# Patient Record
Sex: FEMALE | Race: WHITE | NOT HISPANIC OR LATINO | URBAN - METROPOLITAN AREA
[De-identification: names, ages, dates, MRNs, and addresses within clinical notes are randomized per-mention and may not be internally consistent; named-entity substitution may affect disease eponyms.]

---

## 2019-05-23 ENCOUNTER — APPOINTMENT (OUTPATIENT)
Dept: URBAN - METROPOLITAN AREA SURGERY 9 | Age: 84
Setting detail: DERMATOLOGY
End: 2019-05-23

## 2019-05-23 DIAGNOSIS — Z85.828 PERSONAL HISTORY OF OTHER MALIGNANT NEOPLASM OF SKIN: ICD-10-CM

## 2019-05-23 DIAGNOSIS — L57.0 ACTINIC KERATOSIS: ICD-10-CM

## 2019-05-23 PROBLEM — I48.91 UNSPECIFIED ATRIAL FIBRILLATION: Status: ACTIVE | Noted: 2019-05-23

## 2019-05-23 PROBLEM — D48.5 NEOPLASM OF UNCERTAIN BEHAVIOR OF SKIN: Status: ACTIVE | Noted: 2019-05-23

## 2019-05-23 PROCEDURE — 17003 DESTRUCT PREMALG LES 2-14: CPT

## 2019-05-23 PROCEDURE — OTHER PATHOLOGY BILLING: OTHER

## 2019-05-23 PROCEDURE — OTHER BIOPSY BY SHAVE METHOD: OTHER

## 2019-05-23 PROCEDURE — OTHER LIQUID NITROGEN: OTHER

## 2019-05-23 PROCEDURE — 99202 OFFICE O/P NEW SF 15 MIN: CPT | Mod: 25

## 2019-05-23 PROCEDURE — 11102 TANGNTL BX SKIN SINGLE LES: CPT

## 2019-05-23 PROCEDURE — 17000 DESTRUCT PREMALG LESION: CPT | Mod: 59

## 2019-05-23 PROCEDURE — 11103 TANGNTL BX SKIN EA SEP/ADDL: CPT

## 2019-05-23 PROCEDURE — OTHER REASSURANCE: OTHER

## 2019-05-23 PROCEDURE — OTHER MIPS QUALITY: OTHER

## 2019-05-23 PROCEDURE — 88305 TISSUE EXAM BY PATHOLOGIST: CPT | Mod: TC

## 2019-05-23 ASSESSMENT — LOCATION DETAILED DESCRIPTION DERM
LOCATION DETAILED: RIGHT NASAL ROOT
LOCATION DETAILED: RIGHT SUPERIOR HELIX
LOCATION DETAILED: POSTERIOR MID-PARIETAL SCALP
LOCATION DETAILED: LEFT DORSAL WRIST
LOCATION DETAILED: LEFT NASAL SIDEWALL

## 2019-05-23 ASSESSMENT — LOCATION ZONE DERM
LOCATION ZONE: ARM
LOCATION ZONE: SCALP
LOCATION ZONE: EAR
LOCATION ZONE: NOSE

## 2019-05-23 ASSESSMENT — LOCATION SIMPLE DESCRIPTION DERM
LOCATION SIMPLE: RIGHT EAR
LOCATION SIMPLE: POSTERIOR SCALP
LOCATION SIMPLE: LEFT WRIST
LOCATION SIMPLE: LEFT NOSE
LOCATION SIMPLE: NOSE

## 2019-05-23 NOTE — PROCEDURE: LIQUID NITROGEN
Duration Of Freeze Thaw-Cycle (Seconds): 5
Render In Bullet Format When Appropriate: No
Detail Level: Simple
Total Number Of Aks Treated: 3
Consent: The patient's consent was obtained including but not limited to risks of crusting, blistering, scarring, pigmentary change.
Number Of Freeze-Thaw Cycles: 1 freeze-thaw cycle
Post-Care Instructions: Pt may apply Vaseline to crusted or scabbing areas.

## 2019-05-23 NOTE — PROCEDURE: PATHOLOGY BILLING
Immunohistochemistry (10914 and [id number]) billing is not performed here. Please use the Immunohistochemistry Stain Billing plan to accomplish this. Immunohistochemistry (45377 and 60076) billing is not performed here. Please use the Immunohistochemistry Stain Billing plan to accomplish this.

## 2019-06-18 ENCOUNTER — APPOINTMENT (OUTPATIENT)
Dept: URBAN - METROPOLITAN AREA SURGERY 9 | Age: 84
Setting detail: DERMATOLOGY
End: 2019-06-19

## 2019-06-18 VITALS — DIASTOLIC BLOOD PRESSURE: 80 MMHG | SYSTOLIC BLOOD PRESSURE: 120 MMHG | HEART RATE: 60 BPM | RESPIRATION RATE: 16 BRPM

## 2019-06-18 PROBLEM — C44.329 SQUAMOUS CELL CARCINOMA OF SKIN OF OTHER PARTS OF FACE: Status: ACTIVE | Noted: 2019-06-18

## 2019-06-18 PROBLEM — C44.319 BASAL CELL CARCINOMA OF SKIN OF OTHER PARTS OF FACE: Status: ACTIVE | Noted: 2019-06-18

## 2019-06-18 PROBLEM — J44.9 CHRONIC OBSTRUCTIVE PULMONARY DISEASE, UNSPECIFIED: Status: ACTIVE | Noted: 2019-06-18

## 2019-06-18 PROCEDURE — 14301 TIS TRNFR ANY 30.1-60 SQ CM: CPT

## 2019-06-18 PROCEDURE — OTHER MOHS SURGERY: OTHER

## 2019-06-18 PROCEDURE — 13132 CMPLX RPR F/C/C/M/N/AX/G/H/F: CPT | Mod: 59

## 2019-06-18 PROCEDURE — OTHER PRESCRIPTION: OTHER

## 2019-06-18 PROCEDURE — 17311 MOHS 1 STAGE H/N/HF/G: CPT

## 2019-06-18 PROCEDURE — 99212 OFFICE O/P EST SF 10 MIN: CPT | Mod: 25

## 2019-06-18 PROCEDURE — OTHER CONSULTATION FOR MOHS SURGERY: OTHER

## 2019-06-18 PROCEDURE — 17312 MOHS ADDL STAGE: CPT

## 2019-06-18 PROCEDURE — 17311 MOHS 1 STAGE H/N/HF/G: CPT | Mod: 76

## 2019-06-18 RX ORDER — HYDROCODONE BITARTRATE AND ACETAMINOPHEN 5; 325 MG/1; MG/1
TABLET ORAL
Qty: 8 | Refills: 0 | COMMUNITY
Start: 2019-06-18

## 2019-06-18 NOTE — PROCEDURE: CONSULTATION FOR MOHS SURGERY
Detail Level: Detailed
X Size Of Lesion In Cm (Optional): 0
Body Location Override (Optional - Billing Will Still Be Based On Selected Body Map Location If Applicable): left inferior central forehead
Incorporate Mauc In Note: Yes
Body Location Override (Optional - Billing Will Still Be Based On Selected Body Map Location If Applicable): left superior medial buccal cheek

## 2019-06-21 ENCOUNTER — APPOINTMENT (OUTPATIENT)
Dept: URBAN - METROPOLITAN AREA SURGERY 9 | Age: 84
Setting detail: DERMATOLOGY
End: 2019-06-24

## 2019-06-21 DIAGNOSIS — L76.21 POSTPROCEDURAL HEMORRHAGE OF SKIN AND SUBCUTANEOUS TISSUE FOLLOWING A DERMATOLOGIC PROCEDURE: ICD-10-CM

## 2019-06-21 PROCEDURE — 99024 POSTOP FOLLOW-UP VISIT: CPT

## 2019-06-21 PROCEDURE — OTHER POST-OP WOUND CHECK: OTHER

## 2019-06-21 PROCEDURE — OTHER PRESCRIPTION: OTHER

## 2019-06-21 RX ORDER — DOXYCYCLINE HYCLATE 100 MG/1
TABLET, COATED ORAL BID
Qty: 10 | Refills: 0 | COMMUNITY
Start: 2019-06-21

## 2019-06-21 RX ORDER — HYDROCODONE BITARTRATE AND ACETAMINOPHEN 5; 325 MG/1; MG/1
TABLET ORAL
Qty: 4 | Refills: 0 | COMMUNITY
Start: 2019-06-21

## 2019-06-21 ASSESSMENT — LOCATION DETAILED DESCRIPTION DERM: LOCATION DETAILED: LEFT INFERIOR FOREHEAD

## 2019-06-21 ASSESSMENT — LOCATION SIMPLE DESCRIPTION DERM: LOCATION SIMPLE: LEFT FOREHEAD

## 2019-06-21 ASSESSMENT — LOCATION ZONE DERM: LOCATION ZONE: FACE

## 2019-06-21 NOTE — PROCEDURE: POST-OP WOUND CHECK
Additional Comments: Antibiotics given due to increased risk of infection in nursing home, manipulation of dressing and need to keep dressing in place for 3 more days due to restrictions in wound care at nursing home
Detail Level: Zone
Wound Evaluated By: Dr. Amor
Add 63249 Cpt? (Important Note: In 2017 The Use Of 43718 Is Being Tracked By Cms To Determine Future Global Period Reimbursement For Global Periods): yes

## 2019-07-02 ENCOUNTER — APPOINTMENT (OUTPATIENT)
Dept: URBAN - METROPOLITAN AREA SURGERY 9 | Age: 84
Setting detail: DERMATOLOGY
End: 2019-07-08

## 2019-07-02 VITALS — RESPIRATION RATE: 16 BRPM | DIASTOLIC BLOOD PRESSURE: 70 MMHG | HEART RATE: 60 BPM | SYSTOLIC BLOOD PRESSURE: 124 MMHG

## 2019-07-02 PROBLEM — C44.329 SQUAMOUS CELL CARCINOMA OF SKIN OF OTHER PARTS OF FACE: Status: ACTIVE | Noted: 2019-07-02

## 2019-07-02 PROCEDURE — 14041 TIS TRNFR F/C/C/M/N/A/G/H/F: CPT | Mod: 79

## 2019-07-02 PROCEDURE — 17312 MOHS ADDL STAGE: CPT | Mod: 79

## 2019-07-02 PROCEDURE — OTHER MOHS SURGERY: OTHER

## 2019-07-02 PROCEDURE — 17311 MOHS 1 STAGE H/N/HF/G: CPT | Mod: 79

## 2019-07-02 PROCEDURE — OTHER RETURN TO REFERRING PROVIDER: OTHER

## 2019-08-27 ENCOUNTER — APPOINTMENT (OUTPATIENT)
Dept: URBAN - METROPOLITAN AREA SURGERY 9 | Age: 84
Setting detail: DERMATOLOGY
End: 2019-08-29

## 2019-08-27 DIAGNOSIS — Z48.817 ENCOUNTER FOR SURGICAL AFTERCARE FOLLOWING SURGERY ON THE SKIN AND SUBCUTANEOUS TISSUE: ICD-10-CM

## 2019-08-27 PROCEDURE — OTHER POST-OP WOUND CHECK: OTHER

## 2019-08-27 PROCEDURE — 99024 POSTOP FOLLOW-UP VISIT: CPT

## 2019-08-27 ASSESSMENT — LOCATION DETAILED DESCRIPTION DERM: LOCATION DETAILED: RIGHT INFERIOR CENTRAL MALAR CHEEK

## 2019-08-27 ASSESSMENT — LOCATION ZONE DERM: LOCATION ZONE: FACE

## 2019-08-27 ASSESSMENT — LOCATION SIMPLE DESCRIPTION DERM: LOCATION SIMPLE: RIGHT CHEEK

## 2019-08-27 NOTE — PROCEDURE: POST-OP WOUND CHECK
Detail Level: Detailed
Add 94100 Cpt? (Important Note: In 2017 The Use Of 83191 Is Being Tracked By Cms To Determine Future Global Period Reimbursement For Global Periods): yes
Body Location Override (Optional - Billing Will Still Be Based On Selected Body Map Location If Applicable): right medial malar cheek
Wound Evaluated By: Dr. Blackburn

## 2019-11-22 ENCOUNTER — APPOINTMENT (OUTPATIENT)
Dept: URBAN - METROPOLITAN AREA SURGERY 9 | Age: 84
Setting detail: DERMATOLOGY
End: 2019-11-22

## 2019-11-22 DIAGNOSIS — Z85.828 PERSONAL HISTORY OF OTHER MALIGNANT NEOPLASM OF SKIN: ICD-10-CM

## 2019-11-22 DIAGNOSIS — L82.1 OTHER SEBORRHEIC KERATOSIS: ICD-10-CM

## 2019-11-22 DIAGNOSIS — L57.0 ACTINIC KERATOSIS: ICD-10-CM

## 2019-11-22 PROBLEM — D48.5 NEOPLASM OF UNCERTAIN BEHAVIOR OF SKIN: Status: ACTIVE | Noted: 2019-11-22

## 2019-11-22 PROCEDURE — OTHER LIQUID NITROGEN: OTHER

## 2019-11-22 PROCEDURE — OTHER BIOPSY BY SHAVE METHOD AND DESTRUCTION: OTHER

## 2019-11-22 PROCEDURE — 11103 TANGNTL BX SKIN EA SEP/ADDL: CPT

## 2019-11-22 PROCEDURE — OTHER OTHER: OTHER

## 2019-11-22 PROCEDURE — 11102 TANGNTL BX SKIN SINGLE LES: CPT

## 2019-11-22 PROCEDURE — 17003 DESTRUCT PREMALG LES 2-14: CPT

## 2019-11-22 PROCEDURE — 17000 DESTRUCT PREMALG LESION: CPT | Mod: 59

## 2019-11-22 PROCEDURE — OTHER BIOPSY BY SHAVE METHOD: OTHER

## 2019-11-22 PROCEDURE — OTHER REASSURANCE: OTHER

## 2019-11-22 PROCEDURE — 99213 OFFICE O/P EST LOW 20 MIN: CPT | Mod: 25

## 2019-11-22 ASSESSMENT — LOCATION SIMPLE DESCRIPTION DERM
LOCATION SIMPLE: GLABELLA
LOCATION SIMPLE: LEFT WRIST
LOCATION SIMPLE: LEFT CHEEK
LOCATION SIMPLE: POSTERIOR SCALP
LOCATION SIMPLE: UPPER BACK
LOCATION SIMPLE: RIGHT EAR
LOCATION SIMPLE: LEFT FOREHEAD

## 2019-11-22 ASSESSMENT — LOCATION DETAILED DESCRIPTION DERM
LOCATION DETAILED: SUPERIOR THORACIC SPINE
LOCATION DETAILED: POSTERIOR MID-PARIETAL SCALP
LOCATION DETAILED: LEFT INFERIOR FOREHEAD
LOCATION DETAILED: GLABELLA
LOCATION DETAILED: LEFT MEDIAL MALAR CHEEK
LOCATION DETAILED: LEFT DORSAL WRIST
LOCATION DETAILED: RIGHT SUPERIOR HELIX

## 2019-11-22 ASSESSMENT — LOCATION ZONE DERM
LOCATION ZONE: FACE
LOCATION ZONE: TRUNK
LOCATION ZONE: ARM
LOCATION ZONE: EAR
LOCATION ZONE: SCALP

## 2019-11-22 NOTE — PROCEDURE: LIQUID NITROGEN
Post-Care Instructions: Pt may apply Vaseline to crusted or scabbing areas.
Render In Bullet Format When Appropriate: No
Total Number Of Aks Treated: 2
Duration Of Freeze Thaw-Cycle (Seconds): 5
Detail Level: Simple
Number Of Freeze-Thaw Cycles: 1 freeze-thaw cycle
Consent: The patient's consent was obtained including but not limited to risks of crusting, blistering, scarring, pigmentary change.

## 2019-11-22 NOTE — PROCEDURE: OTHER
Detail Level: Simple
Note Text (......Xxx Chief Complaint.): This diagnosis correlates with the
Other (Free Text): C&D was attempted but it quickly went to fat. Consider excision unless needing to go to [facility] for the sidewall biopsy site

## 2020-01-08 ENCOUNTER — APPOINTMENT (OUTPATIENT)
Dept: URBAN - METROPOLITAN AREA SURGERY 9 | Age: 85
Setting detail: DERMATOLOGY
End: 2020-01-09

## 2020-01-08 PROBLEM — C44.319 BASAL CELL CARCINOMA OF SKIN OF OTHER PARTS OF FACE: Status: ACTIVE | Noted: 2020-01-08

## 2020-01-08 PROCEDURE — 12052 INTMD RPR FACE/MM 2.6-5.0 CM: CPT

## 2020-01-08 PROCEDURE — 11642 EXC F/E/E/N/L MAL+MRG 1.1-2: CPT

## 2020-01-08 PROCEDURE — OTHER EXCISION: OTHER

## 2020-01-08 NOTE — PROCEDURE: EXCISION
Path Notes (To The Dermatopathologist): Please check margins. Superior score\\nAccession #[id number] Path Notes (To The Dermatopathologist): Please check margins. Superior score\\nAccession #[id number]

## 2020-07-16 NOTE — PROCEDURE: BIOPSY BY SHAVE METHOD
and her legs gave out. Left hip is tender now and she scraped her knee up some. Feels like hip is tender on lateral side from bruise from fall. Legs are weak, is seeing PT/OT and that's going well.      Is able to take thyroid medication. Sees endocrine. Currently taking 250 of Levothyroxine     Not having alcoholic beverages every day. 6-7 days out of the month she drinks 6-8 drinks on those days. Discussed this may be a cause of sinus tachycardia and third spacing/nutrition issues. Patient Active Problem List   Diagnosis    TIA (transient ischemic attack)    Hypothyroidism    DVT (deep venous thrombosis) (HCC)    Depression    Anxiety    Fibromyalgia    Fatty liver    Tachycardia    GERD (gastroesophageal reflux disease)    Recurrent cold sores    Irritable bowel    Gastric bypass status for obesity    Insomnia    Ovarian cyst    Muscle weakness (generalized)    Alcoholism (HCC)    Osteoarthritis    Hypocalcemia    Hypomagnesemia    Moderate malnutrition (HCC)    Other hypoparathyroidism (HCC)    Pancytopenia (HCC)    Palpitations    Asymptomatic bacteriuria    Hyperphosphatemia    Prolonged Q-T interval on ECG    Hypoalbuminemia    Tinnitus of both ears    Hx of parathyroidectomy    Normocytic anemia    History of fibromyalgia    Physical deconditioning    Severe malnutrition (HCC)    Bacteremia    Fever    Calcification of breast    Hypotension    Steatosis of liver    Nutritional marasmus (HCC)    Discitis of lumbar region    Discitis    Hypokalemia    Mass of joint of left hip    Weight loss    Lumbar discitis       The patient is allergic to penicillins; sulfa antibiotics; and bactrim [sulfamethoxazole-trimethoprim].     Past Medical History  Leilani Smallwood has a past medical history of Alcoholism ([address] Anxiety, Atrophy of vulva, Atyp squam cell of undet signfc cyto smr crvx (ASC-US), Closed fracture of seventh cervical vertebra without spinal cord injury ([address] Closed fracture of sixth cervical vertebra (HCC), Closed fracture of thoracic vertebra ([address] Depression, Dry eye syndrome, DVT (deep venous thrombosis) ([address] Dyspareunia in female, External hemorrhoids without complication, Fibromyalgia, Gastric bypass status for obesity, GERD (gastroesophageal reflux disease), Hiatal hernia, History of ITP, Hungry bone syndrome, Hx of blood clots, Hyperparathyroidism ([address] Hypocalcemia, Hypothyroidism, Insomnia, Irritable bowel, Lactose intolerance, Menopausal syndrome, Osteoarthritis, Osteopenia, Osteopenia, Ovarian cyst, Oxaluria ([address] Recurrent cold sores, Sepsis ([address] Tachycardia, TIA (transient ischemic attack), and Tubular adenoma of colon. Past SurgicalHistory  The patient  has a past surgical history that includes Wrist fracture surgery (Right); Leg Surgery (Right); Tonsillectomy and adenoidectomy; Gastric bypass surgery (1996); Cholecystectomy (1996); Appendectomy (1996); parathyroidectomy (08/2017); Colonoscopy (approx 2013); Upper gastrointestinal endoscopy (approx 2013); laparoscopy; Foot surgery; Tubal ligation (1996); Endoscopy, colon, diagnostic; fracture surgery; Dilatation, esophagus; Colonoscopy (Left, 5/7/2019); and Upper gastrointestinal endoscopy (Left, 5/7/2019). Family History  This patient's family history includes Asthma in her maternal grandfather; Breast Cancer in her mother and sister; Cancer in her father; Depression in her father and mother; Heart Attack in her maternal grandmother; High Blood Pressure in her father and mother; Mental Illness in her maternal grandmother; Other in her father, maternal grandfather, maternal grandmother, and mother. Social History  Prince Biswas  reports that she has never smoked. She has never used smokeless tobacco. She reports current alcohol use of about 9.0 standard drinks of alcohol per week. She reports that she does not use drugs.     Medications    Current Outpatient Medications:     potassium chloride (MICRO-K) 10 MEQ extended release capsule, Take 10 mEq by mouth daily , Disp: , Rfl:     Psyllium (METAMUCIL FIBER PO), Take by mouth 5 capsules once daily, Disp: , Rfl:     aMILoride (MIDAMOR) 5 MG tablet, TAKING 2.5MG THREE TIMES WEEKLY, Disp: 30 tablet, Rfl: 3    bumetanide (BUMEX) 1 MG tablet, TAKE ONE HALF TAB  THREE TIMES WEEKLY, Disp: 90 tablet, Rfl: 1    DULoxetine (CYMBALTA) 30 MG extended release capsule, TAKE 1 CAPSULE DAILY, Disp: 90 capsule, Rfl: 0    DULoxetine (CYMBALTA) 60 MG extended release capsule, Take 1 capsule by mouth nightly, Disp: 90 capsule, Rfl: 1    gabapentin (NEURONTIN) 100 MG capsule, Take 1 capsule by mouth daily for 90 days. , Disp: 180 capsule, Rfl: 1    gabapentin (NEURONTIN) 300 MG capsule, Take 1 capsule by mouth nightly for 180 days. , Disp: 90 capsule, Rfl: 1    metoprolol tartrate (LOPRESSOR) 25 MG tablet, Take 0.5 tablets by mouth 2 times daily, Disp: 90 tablet, Rfl: 1    tiZANidine (ZANAFLEX) 4 MG tablet, Take 1 tablet by mouth nightly At bed time, Disp: 90 tablet, Rfl: 1    vitamin B-12 (CYANOCOBALAMIN) 500 MCG tablet, TAKE 1 TABLET DAILY, Disp: 30 tablet, Rfl: 11    acyclovir (ZOVIRAX) 400 MG tablet, TAKE 1 TABLET TWICE A DAY, Disp: 180 tablet, Rfl: 3    Estradiol (VAGIFEM) 10 MCG TABS vaginal tablet, AT THE START OF THERAPY INSERT 1 TABLET VAGINALLY DAILY FOR 2 WEEKS , THEN TWICE WEEKLY THEREAFTER, Disp: 40 tablet, Rfl: 3    CALCIUM REPLACEMENT PROTOCOL, 500 mg by Other route daily , Disp: , Rfl:     Parathyroid Hormone, Recomb, 50 MCG CART, Inject 0.1 mcg into the skin Natpara Injection, Disp: , Rfl:     calcitRIOL (ROCALTROL) 0.5 MCG capsule, Two tablets twice daily (Patient taking differently: Take 0.5 mcg by mouth daily Two tablets twice daily), Disp: 30 capsule, Rfl: 3    magnesium cl-calcium carbonate (SLOW-MAG) 71.5-119 MG TBEC tablet, Take 1 tablet by mouth 4 times daily (Patient taking differently: Take 1 tablet by mouth 2 times daily ), Disp: 3 tablet, Rfl: Render In Bullet Format When Appropriate: No 0    loperamide (IMODIUM) 2 MG capsule, Take 2 capsules by mouth 4 times daily as needed for Diarrhea, Disp: , Rfl:     SYNTHROID 50 MCG tablet, Take 1 tablet by mouth Daily Additional RF per her endocrinoloist, Disp: 30 tablet, Rfl: 0    vitamin B-6 (PYRIDOXINE) 100 MG tablet, Take 100 mg by mouth daily, Disp: , Rfl:     zinc gluconate 50 MG tablet, TAKE ONE AND ONE-HALF TABLETS (75 MG) DAILY, Disp: 135 tablet, Rfl: 0    vitamin B-1 (THIAMINE) 100 MG tablet, Take 100 mg by mouth daily, Disp: , Rfl:     ondansetron (ZOFRAN ODT) 4 MG disintegrating tablet, Take 1 tablet by mouth every 8 hours as needed for Nausea, Disp: 10 tablet, Rfl: 0    Vitamin K, Phytonadione, 100 MCG TABS, Take 3 tablets by mouth daily, Disp: 270 tablet, Rfl: 0    FOLIC ACID PO, Take 1,611 mg by mouth daily, Disp: , Rfl:     SYNTHROID 200 MCG tablet, Take 200 mcg by mouth daily, Disp: , Rfl:     esomeprazole (NEXIUM) 20 MG delayed release capsule, Take 20 mg by mouth 2 times daily, Disp: , Rfl:     vitamin A 33810 units capsule, Take 25,000 Units by mouth daily, Disp: , Rfl:     neomycin-bacitracin-polymyxin (NEOSPORIN) 400-5-5000 ointment, Apply topically, Disp: , Rfl:     Subjective:      Review of Systems   Constitutional: Negative for chills and fever. HENT: Positive for rhinorrhea, sneezing and sore throat. Negative for congestion, postnasal drip, sinus pressure, sinus pain and trouble swallowing. Seasonal allergies likely    Eyes: Positive for pain. Has chronic dry eye   Respiratory: Positive for cough and shortness of breath. Negative for wheezing. Mild slight dry cough  SOB on exertion, attributes to calcium   Cardiovascular: Positive for leg swelling. Negative for chest pain. Gastrointestinal: Positive for diarrhea. Negative for abdominal pain, blood in stool, constipation, nausea and vomiting.         Due to gastric bypass   Genitourinary: Negative for dysuria, frequency, urgency and vaginal DULoxetine (CYMBALTA) 60 MG extended release capsule; Take 1 capsule by mouth nightly  Dispense: 90 capsule; Refill: 1  - gabapentin (NEURONTIN) 100 MG capsule; Take 1 capsule by mouth daily for 90 days. Dispense: 180 capsule; Refill: 1  - gabapentin (NEURONTIN) 300 MG capsule; Take 1 capsule by mouth nightly for 180 days. Dispense: 90 capsule; Refill: 1  - tiZANidine (ZANAFLEX) 4 MG tablet; Take 1 tablet by mouth nightly At bed time  Dispense: 90 tablet; Refill: 1    11. Palpitations  Has sinus tach when off the metoprolol  - metoprolol tartrate (LOPRESSOR) 25 MG tablet; Take 0.5 tablets by mouth 2 times daily  Dispense: 90 tablet; Refill: 1         Return in about 3 months (around 10/16/2020) for Follow-up chronic conditions. Orders Placed   Orders Placed This Encounter   Procedures    Comprehensive Metabolic Panel     Standing Status:   Future     Standing Expiration Date:   7/16/2021       Prescriptions given/sent  Orders Placed This Encounter   Medications    aMILoride (MIDAMOR) 5 MG tablet     Sig: TAKING 2.5MG THREE TIMES WEEKLY     Dispense:  30 tablet     Refill:  3    bumetanide (BUMEX) 1 MG tablet     Sig: TAKE ONE HALF TAB  THREE TIMES WEEKLY     Dispense:  90 tablet     Refill:  1    DULoxetine (CYMBALTA) 30 MG extended release capsule     Sig: TAKE 1 CAPSULE DAILY     Dispense:  90 capsule     Refill:  0    DULoxetine (CYMBALTA) 60 MG extended release capsule     Sig: Take 1 capsule by mouth nightly     Dispense:  90 capsule     Refill:  1    gabapentin (NEURONTIN) 100 MG capsule     Sig: Take 1 capsule by mouth daily for 90 days. Dispense:  180 capsule     Refill:  1    gabapentin (NEURONTIN) 300 MG capsule     Sig: Take 1 capsule by mouth nightly for 180 days.      Dispense:  90 capsule     Refill:  1    metoprolol tartrate (LOPRESSOR) 25 MG tablet     Sig: Take 0.5 tablets by mouth 2 times daily     Dispense:  90 tablet     Refill:  1    tiZANidine (ZANAFLEX) 4 MG tablet     Sig: Take 1 tablet by mouth nightly At bed time     Dispense:  90 tablet     Refill:  1       Patient given educational materials - see patient instructions. Discussed use, benefit, and side effects of prescribed medications. All patientquestions answered. Pt voiced understanding. Reviewed health maintenance. Attending attestation:  I performed a history and physical examination of the patient and discussed management with the resident. I reviewed the resident's note and agree with the documented findings and plan of care. Cystic structure in left adnexa is stable but anasarca. I suspect alcoholism with poor nutrition in combination with gastric bypass and electrolyte abnormalities related to hyperparathyroidism all contribute to her presentation. Discussed that blood pressure being low may contribute to hypotension and falls, patient is not amenable to wean of any agents today. I did encourage follow-up and discussion with her specialist. Kavya Enter in detail regarding alcoholism. I think when she has stopped drinking in the past, absence of prompt improvement in functional status has been a disappointment. Encouraged re-trial of cessation. It may take time (a year or more) to see improvement given chronic malnutrition. Patient is contemplative. At baseline overall. May trial allergy medications. Indications for prompt return if worsening reviewed in detail.               Electronically signed by Sondra Nava MD on 7/16/2020 at 5:03 PM

## 2020-08-27 NOTE — PROCEDURE: EXCISION
Bilobed Transposition Flap Text: The defect edges were debeveled with a #15 scalpel blade.  Given the location of the defect and the proximity to free margins a bilobed transposition flap was deemed most appropriate.  Using a sterile surgical marker, an appropriate bilobe flap drawn around the defect.    The area thus outlined was incised deep to adipose tissue with a #15 scalpel blade.  The skin margins were undermined to an appropriate distance in all directions utilizing iris scissors. 27-Aug-2020 12:47